# Patient Record
Sex: FEMALE
[De-identification: names, ages, dates, MRNs, and addresses within clinical notes are randomized per-mention and may not be internally consistent; named-entity substitution may affect disease eponyms.]

---

## 2017-11-20 ENCOUNTER — RESULT REVIEW (OUTPATIENT)
Age: 32
End: 2017-11-20

## 2018-11-07 PROBLEM — Z00.00 ENCOUNTER FOR PREVENTIVE HEALTH EXAMINATION: Status: ACTIVE | Noted: 2018-11-07

## 2018-11-16 ENCOUNTER — APPOINTMENT (OUTPATIENT)
Dept: ORTHOPEDIC SURGERY | Facility: CLINIC | Age: 33
End: 2018-11-16
Payer: OTHER GOVERNMENT

## 2018-11-16 VITALS
DIASTOLIC BLOOD PRESSURE: 72 MMHG | WEIGHT: 117 LBS | BODY MASS INDEX: 22.09 KG/M2 | HEART RATE: 75 BPM | SYSTOLIC BLOOD PRESSURE: 109 MMHG | HEIGHT: 61 IN

## 2018-11-16 DIAGNOSIS — M54.16 RADICULOPATHY, LUMBAR REGION: ICD-10-CM

## 2018-11-16 DIAGNOSIS — M54.12 RADICULOPATHY, CERVICAL REGION: ICD-10-CM

## 2018-11-16 PROCEDURE — 99204 OFFICE O/P NEW MOD 45 MIN: CPT

## 2018-12-10 ENCOUNTER — APPOINTMENT (OUTPATIENT)
Dept: NEUROLOGY | Facility: CLINIC | Age: 33
End: 2018-12-10
Payer: OTHER GOVERNMENT

## 2018-12-10 VITALS
BODY MASS INDEX: 22.28 KG/M2 | HEART RATE: 72 BPM | WEIGHT: 118 LBS | HEIGHT: 61 IN | DIASTOLIC BLOOD PRESSURE: 70 MMHG | SYSTOLIC BLOOD PRESSURE: 106 MMHG

## 2018-12-10 DIAGNOSIS — G37.9 DEMYELINATING DISEASE OF CENTRAL NERVOUS SYSTEM, UNSPECIFIED: ICD-10-CM

## 2018-12-10 DIAGNOSIS — M25.551 PAIN IN RIGHT HIP: ICD-10-CM

## 2018-12-10 DIAGNOSIS — R20.0 ANESTHESIA OF SKIN: ICD-10-CM

## 2018-12-10 DIAGNOSIS — R29.818 OTHER SYMPTOMS AND SIGNS INVOLVING THE NERVOUS SYSTEM: ICD-10-CM

## 2018-12-10 DIAGNOSIS — M25.549 PAIN IN JOINTS OF UNSPECIFIED HAND: ICD-10-CM

## 2018-12-10 DIAGNOSIS — R29.2 ABNORMAL REFLEX: ICD-10-CM

## 2018-12-10 DIAGNOSIS — G12.21 AMYOTROPHIC LATERAL SCLEROSIS: ICD-10-CM

## 2018-12-10 DIAGNOSIS — M25.552 PAIN IN RIGHT HIP: ICD-10-CM

## 2018-12-10 PROCEDURE — 99205 OFFICE O/P NEW HI 60 MIN: CPT

## 2018-12-11 ENCOUNTER — OTHER (OUTPATIENT)
Age: 33
End: 2018-12-11

## 2018-12-17 ENCOUNTER — OUTPATIENT (OUTPATIENT)
Dept: OUTPATIENT SERVICES | Facility: HOSPITAL | Age: 33
LOS: 1 days | End: 2018-12-17
Payer: OTHER GOVERNMENT

## 2018-12-17 ENCOUNTER — APPOINTMENT (OUTPATIENT)
Dept: MRI IMAGING | Facility: CLINIC | Age: 33
End: 2018-12-17
Payer: OTHER GOVERNMENT

## 2018-12-17 DIAGNOSIS — Z00.8 ENCOUNTER FOR OTHER GENERAL EXAMINATION: ICD-10-CM

## 2018-12-17 DIAGNOSIS — R29.2 ABNORMAL REFLEX: ICD-10-CM

## 2018-12-17 DIAGNOSIS — G37.9 DEMYELINATING DISEASE OF CENTRAL NERVOUS SYSTEM, UNSPECIFIED: ICD-10-CM

## 2018-12-17 DIAGNOSIS — G12.21 AMYOTROPHIC LATERAL SCLEROSIS: ICD-10-CM

## 2018-12-17 PROCEDURE — 70553 MRI BRAIN STEM W/O & W/DYE: CPT | Mod: 26

## 2018-12-17 PROCEDURE — 70553 MRI BRAIN STEM W/O & W/DYE: CPT

## 2018-12-17 PROCEDURE — 72156 MRI NECK SPINE W/O & W/DYE: CPT | Mod: 26

## 2018-12-17 PROCEDURE — 72156 MRI NECK SPINE W/O & W/DYE: CPT

## 2018-12-17 PROCEDURE — A9585: CPT

## 2019-01-03 ENCOUNTER — RESULT REVIEW (OUTPATIENT)
Age: 34
End: 2019-01-03

## 2019-01-15 ENCOUNTER — APPOINTMENT (OUTPATIENT)
Dept: NEUROLOGY | Facility: CLINIC | Age: 34
End: 2019-01-15
Payer: OTHER GOVERNMENT

## 2019-01-15 PROCEDURE — 95910 NRV CNDJ TEST 7-8 STUDIES: CPT

## 2019-01-15 PROCEDURE — 95885 MUSC TST DONE W/NERV TST LIM: CPT

## 2019-01-24 ENCOUNTER — APPOINTMENT (OUTPATIENT)
Dept: NEUROLOGY | Facility: CLINIC | Age: 34
End: 2019-01-24

## 2019-01-29 ENCOUNTER — TRANSCRIPTION ENCOUNTER (OUTPATIENT)
Age: 34
End: 2019-01-29

## 2019-02-07 ENCOUNTER — APPOINTMENT (OUTPATIENT)
Dept: NEUROLOGY | Facility: CLINIC | Age: 34
End: 2019-02-07
Payer: OTHER GOVERNMENT

## 2019-02-07 VITALS
HEART RATE: 66 BPM | DIASTOLIC BLOOD PRESSURE: 67 MMHG | WEIGHT: 116 LBS | SYSTOLIC BLOOD PRESSURE: 100 MMHG | HEIGHT: 61 IN | BODY MASS INDEX: 21.9 KG/M2

## 2019-02-07 DIAGNOSIS — M79.2 NEURALGIA AND NEURITIS, UNSPECIFIED: ICD-10-CM

## 2019-02-07 PROCEDURE — 99214 OFFICE O/P EST MOD 30 MIN: CPT

## 2019-02-13 NOTE — ASSESSMENT
[FreeTextEntry1] : 32 yo woman with intermittent numbness and paresthesias affecting the arms and legs, will evaluate for peripheral neuropathy (small fiber vs large fiber).\par History of cervical spondylosis with chronic posterior neck pain.\par \par Plan:\par 1. Refer to Dr Parra for epidermal nerve fiber density skin biopsy (small fiber neuropathy?)\par 2. Rheumatology consult re: joint pain\par 3. Patient agrees with plan.\par \par Alex Blanchard MD\par [facility]\par \par Time Spent: 25 minutes taking history, performing examination, and counseling on diagnosis and management.

## 2019-02-13 NOTE — HISTORY OF PRESENT ILLNESS
[FreeTextEntry1] : 34 yo woman with intermittent numbness and paresthesias affecting the arms and legs, will evaluate for peripheral neuropathy (small fiber vs large fiber).\par History of cervical spondylosis with chronic posterior neck pain.\par \par EMG was normal.  \par \par She does not want medication, so did not continue the Gabapentin trial.

## 2019-02-13 NOTE — PHYSICAL EXAM
[FreeTextEntry1] : Awake, alert, oriented x 3. Language fluent. Comprehension intact. Naming intact. Repetition intact. Affect normal. Cranial nerves grossly intact. Motor exam: normal bulk, normal tone, 5/5 in all four extremities. No tremors or fasciculations. Sensory exam: intact to LT/PP/SHAVONNE/vibration. DTRs: 2+ throughout, flexor plantar response bilaterally, no clonus. Coordination: no dysmetria. Gait: normal toe/heel/tandem gait. Romberg - negative

## 2019-03-20 ENCOUNTER — TRANSCRIPTION ENCOUNTER (OUTPATIENT)
Age: 34
End: 2019-03-20

## 2019-03-26 ENCOUNTER — APPOINTMENT (OUTPATIENT)
Dept: NEUROLOGY | Facility: CLINIC | Age: 34
End: 2019-03-26
Payer: OTHER GOVERNMENT

## 2019-03-26 PROCEDURE — 11104 PUNCH BX SKIN SINGLE LESION: CPT

## 2019-03-26 PROCEDURE — 11105 PUNCH BX SKIN EA SEP/ADDL: CPT

## 2019-04-22 RX ORDER — GABAPENTIN 300 MG/1
300 CAPSULE ORAL 3 TIMES DAILY
Qty: 90 | Refills: 5 | Status: ACTIVE | COMMUNITY
Start: 2018-12-10 | End: 1900-01-01